# Patient Record
Sex: FEMALE | Race: WHITE | Employment: FULL TIME | URBAN - METROPOLITAN AREA
[De-identification: names, ages, dates, MRNs, and addresses within clinical notes are randomized per-mention and may not be internally consistent; named-entity substitution may affect disease eponyms.]

---

## 2018-03-24 ENCOUNTER — HOSPITAL ENCOUNTER (EMERGENCY)
Age: 43
Discharge: HOME | End: 2018-03-24
Attending: EMERGENCY MEDICINE | Admitting: EMERGENCY MEDICINE
Payer: COMMERCIAL

## 2018-03-24 VITALS
TEMPERATURE: 97.6 F | HEIGHT: 65 IN | SYSTOLIC BLOOD PRESSURE: 113 MMHG | WEIGHT: 130 LBS | OXYGEN SATURATION: 100 % | DIASTOLIC BLOOD PRESSURE: 74 MMHG | BODY MASS INDEX: 21.66 KG/M2 | RESPIRATION RATE: 12 BRPM | HEART RATE: 82 BPM

## 2018-03-24 DIAGNOSIS — E10.9 TYPE 1 DIABETES MELLITUS WITHOUT COMPLICATION (HCC): ICD-10-CM

## 2018-03-24 DIAGNOSIS — R73.9 BLOOD GLUCOSE ELEVATED: Primary | ICD-10-CM

## 2018-03-24 DIAGNOSIS — E86.0 DEHYDRATION: ICD-10-CM

## 2018-03-24 LAB
ALBUMIN SERPL-MCNC: 4.1 G/DL (ref 3.5–5)
ALBUMIN/GLOB SERPL: 1.4 {RATIO} (ref 1.1–2.2)
ALP SERPL-CCNC: 64 U/L (ref 45–117)
ALT SERPL-CCNC: 22 U/L (ref 12–78)
ANION GAP SERPL CALC-SCNC: 12 MMOL/L (ref 5–15)
APPEARANCE UR: CLEAR
AST SERPL-CCNC: 17 U/L (ref 15–37)
BACTERIA URNS QL MICRO: NEGATIVE /HPF
BASOPHILS # BLD: 0.1 K/UL (ref 0–0.1)
BASOPHILS NFR BLD: 1 % (ref 0–1)
BILIRUB SERPL-MCNC: 1.2 MG/DL (ref 0.2–1)
BILIRUB UR QL: NEGATIVE
BUN SERPL-MCNC: 13 MG/DL (ref 6–20)
BUN/CREAT SERPL: 13 (ref 12–20)
CALCIUM SERPL-MCNC: 8.9 MG/DL (ref 8.5–10.1)
CHLORIDE SERPL-SCNC: 97 MMOL/L (ref 97–108)
CO2 SERPL-SCNC: 26 MMOL/L (ref 21–32)
COLOR UR: ABNORMAL
CREAT SERPL-MCNC: 0.97 MG/DL (ref 0.55–1.02)
DIFFERENTIAL METHOD BLD: NORMAL
EOSINOPHIL # BLD: 0.2 K/UL (ref 0–0.4)
EOSINOPHIL NFR BLD: 2 % (ref 0–7)
EPITH CASTS URNS QL MICRO: ABNORMAL /LPF
ERYTHROCYTE [DISTWIDTH] IN BLOOD BY AUTOMATED COUNT: 12.5 % (ref 11.5–14.5)
EST. AVERAGE GLUCOSE BLD GHB EST-MCNC: 183 MG/DL
GLOBULIN SER CALC-MCNC: 3 G/DL (ref 2–4)
GLUCOSE SERPL-MCNC: 273 MG/DL (ref 65–100)
GLUCOSE UR STRIP.AUTO-MCNC: >1000 MG/DL
HBA1C MFR BLD: 8 % (ref 4.2–6.3)
HCT VFR BLD AUTO: 40.2 % (ref 35–47)
HGB BLD-MCNC: 13.2 G/DL (ref 11.5–16)
HGB UR QL STRIP: NEGATIVE
HYALINE CASTS URNS QL MICRO: ABNORMAL /LPF (ref 0–5)
IMM GRANULOCYTES # BLD: 0 K/UL (ref 0–0.04)
IMM GRANULOCYTES NFR BLD AUTO: 0 % (ref 0–0.5)
KETONES UR QL STRIP.AUTO: 80 MG/DL
LEUKOCYTE ESTERASE UR QL STRIP.AUTO: NEGATIVE
LIPASE SERPL-CCNC: 102 U/L (ref 73–393)
LYMPHOCYTES # BLD: 2.1 K/UL (ref 0.8–3.5)
LYMPHOCYTES NFR BLD: 21 % (ref 12–49)
MCH RBC QN AUTO: 29.9 PG (ref 26–34)
MCHC RBC AUTO-ENTMCNC: 32.8 G/DL (ref 30–36.5)
MCV RBC AUTO: 91.2 FL (ref 80–99)
MONOCYTES # BLD: 0.5 K/UL (ref 0–1)
MONOCYTES NFR BLD: 5 % (ref 5–13)
NEUTS SEG # BLD: 7.3 K/UL (ref 1.8–8)
NEUTS SEG NFR BLD: 72 % (ref 32–75)
NITRITE UR QL STRIP.AUTO: NEGATIVE
NRBC # BLD: 0 K/UL (ref 0–0.01)
NRBC BLD-RTO: 0 PER 100 WBC
PH UR STRIP: 5.5 [PH] (ref 5–8)
PLATELET # BLD AUTO: 211 K/UL (ref 150–400)
PMV BLD AUTO: 10.5 FL (ref 8.9–12.9)
POTASSIUM SERPL-SCNC: 3.7 MMOL/L (ref 3.5–5.1)
PROT SERPL-MCNC: 7.1 G/DL (ref 6.4–8.2)
PROT UR STRIP-MCNC: NEGATIVE MG/DL
RBC # BLD AUTO: 4.41 M/UL (ref 3.8–5.2)
RBC #/AREA URNS HPF: ABNORMAL /HPF (ref 0–5)
SODIUM SERPL-SCNC: 135 MMOL/L (ref 136–145)
SP GR UR REFRACTOMETRY: 1.03 (ref 1–1.03)
UR CULT HOLD, URHOLD: NORMAL
UROBILINOGEN UR QL STRIP.AUTO: 0.2 EU/DL (ref 0.2–1)
WBC # BLD AUTO: 10.2 K/UL (ref 3.6–11)
WBC URNS QL MICRO: ABNORMAL /HPF (ref 0–4)

## 2018-03-24 PROCEDURE — 81001 URINALYSIS AUTO W/SCOPE: CPT | Performed by: NURSE PRACTITIONER

## 2018-03-24 PROCEDURE — 74011250636 HC RX REV CODE- 250/636: Performed by: NURSE PRACTITIONER

## 2018-03-24 PROCEDURE — 99284 EMERGENCY DEPT VISIT MOD MDM: CPT

## 2018-03-24 PROCEDURE — 83036 HEMOGLOBIN GLYCOSYLATED A1C: CPT

## 2018-03-24 PROCEDURE — 83690 ASSAY OF LIPASE: CPT | Performed by: NURSE PRACTITIONER

## 2018-03-24 PROCEDURE — 85025 COMPLETE CBC W/AUTO DIFF WBC: CPT | Performed by: NURSE PRACTITIONER

## 2018-03-24 PROCEDURE — 96360 HYDRATION IV INFUSION INIT: CPT

## 2018-03-24 PROCEDURE — 36415 COLL VENOUS BLD VENIPUNCTURE: CPT | Performed by: NURSE PRACTITIONER

## 2018-03-24 PROCEDURE — 80053 COMPREHEN METABOLIC PANEL: CPT | Performed by: NURSE PRACTITIONER

## 2018-03-24 RX ADMIN — SODIUM CHLORIDE 1000 ML: 900 INJECTION, SOLUTION INTRAVENOUS at 12:45

## 2018-03-24 NOTE — ED NOTES
Patient stated that her blood sugar has been elevated between 250 -395 over the pass 12hrs. Patient also stated that she has been, nauseated through the night and vomited twice around 1030 in the am. She stated that she came in worried about going into DKA.

## 2018-03-24 NOTE — Clinical Note
Thank you for allowing us to care for you today. Please follow-up with your Primary Care provider in the next 2-3 days if your symptoms do not improve. Plan for home: Follow-up with primary care provider for continued blood glucose issues. C ome back to the ER if your symptoms do not improve or worsen.

## 2018-03-24 NOTE — ED PROVIDER NOTES
HPI Comments: Patient is a 52-year-old female with a past medical history significant for type 1 diabetes diagnosed at the age of 7 on insulin pump with a sensor who comes to the ED today ambulatory with complaints of elevated blood sugars in the 250s to 300s with associated nausea and vomiting. Patient states she has been up most of the night trying to correct her blood sugars. Patient states she has not felt well but is without any specific complaints. Patient does state approximately 30 minutes prior to coming to the ED she had an episode of nausea and vomiting. Patient also states that she gave herself an additional 3 units of NovoLog insulin early this morning and with an additional 3 units after speaking with her diabetes provider. Patient denies recent illness, fever, chills, chest pain or shortness of breath. She does state that on her insulin pump that has recently been updated approximately one month ago in order to try troubleshooting she changed her site, tingling, insulin bottle. Patient notes she has never had hospitalization to do ketoacidosis. She has no further complaints at this time. Primary care provider:Marie Horn MD    Endocrinologist: Jae Carter MD    The history is provided by the patient. No  was used. Past Medical History:   Diagnosis Date    Adhesive capsulitis     Diabetes mellitus type 1 ([facility] [address]     dx 1984       No past surgical history on file. Family History:   Problem Relation Age of Onset    Thyroid Disease Paternal Grandmother        Social History     Social History    Marital status:      Spouse name: N/A    Number of children: N/A    Years of education: N/A     Occupational History    Not on file.      Social History Main Topics    Smoking status: Never Smoker    Smokeless tobacco: Not on file    Alcohol use No    Drug use: Not on file    Sexual activity: Not on file     Other Topics Concern    Not on file     Social History Narrative    No narrative on file         ALLERGIES: Review of patient's allergies indicates no known allergies. Review of Systems   Constitutional: Negative for appetite change, chills and fever. HENT: Negative. Respiratory: Negative for cough, shortness of breath and wheezing. Cardiovascular: Negative for chest pain. Gastrointestinal: Positive for nausea and vomiting. Negative for abdominal pain, constipation and diarrhea. Endocrine:        Elevated BG on insulin pump with sensor   Genitourinary: Negative for dysuria and urgency. Musculoskeletal: Negative for back pain. Skin: Negative for color change and rash. Neurological: Negative for dizziness and headaches. Psychiatric/Behavioral: Negative. All other systems reviewed and are negative. Vitals:    03/24/18 1103 03/24/18 1116   BP: 105/62 106/64   Pulse: 94 82   Resp: 16 12   Temp: 97.8 °F (36.6 °C) 97.6 °F (36.4 °C)   SpO2: 100% 100%   Weight: 59 kg (130 lb) 59 kg (130 lb)   Height: 5' 4\" (1.626 m) 5' 4.5\" (1.638 m)            Physical Exam   Constitutional: She is oriented to person, place, and time. She appears well-developed and well-nourished. HENT:   Head: Normocephalic and atraumatic. Neck: Normal range of motion. Neck supple. Cardiovascular: Normal rate, regular rhythm, normal heart sounds and intact distal pulses. Pulmonary/Chest: Effort normal and breath sounds normal. No respiratory distress. She has no wheezes. She has no rales. She exhibits no tenderness. Abdominal: Soft. Bowel sounds are normal. There is no tenderness. There is no guarding. Musculoskeletal: Normal range of motion. Neurological: She is alert and oriented to person, place, and time. Skin: Skin is warm and dry. No erythema. Psychiatric: She has a normal mood and affect. Her behavior is normal. Judgment and thought content normal.   Nursing note and vitals reviewed.        [facility]      ED Course     Assessment & Plan:     Orders Placed This Encounter    URINE CULTURE HOLD SAMPLE    Hold Sample    URINALYSIS W/MICROSCOPIC    CBC WITH AUTOMATED DIFF    METABOLIC PANEL, COMPREHENSIVE    LIPASE       Discussed with Ly Fuchs MD,ED Provider    Belkis Shabazz NP  03/24/18  11:23 AM      Procedures     BG elevated with glucose in the urine with Ketones. Will bolus with 1 L NS and monitor BG from her insulin pump. BG, Urine glucose & ketones are only concerning labs. Otherwise labs not concerning. Belkis Shabazz NP  03/24/18  12:44 PM    BG has significantly improved with IV hydration. She did speak with [employer] & they ran the device through a diagnostic. The senor can read high when there is dehydration. 1:57 PM  The patient has been reevaluated. The patient is ready for discharge. The patient's signs, symptoms, diagnosis, and discharge instructions have been discussed and the patient/ family has conveyed their understanding. The patient is to follow up as recommended or return to the ED should their symptoms worsen. Plan has been discussed and the patient is in agreement. LABORATORY TESTS:  Labs Reviewed   URINALYSIS W/MICROSCOPIC - Abnormal; Notable for the following:        Result Value    Glucose >1000 (*)     Ketone 80 (*)     All other components within normal limits   METABOLIC PANEL, COMPREHENSIVE - Abnormal; Notable for the following:     Sodium 135 (*)     Glucose 273 (*)     Bilirubin, total 1.2 (*)     All other components within normal limits   URINE CULTURE HOLD SAMPLE   SAMPLES BEING HELD   CBC WITH AUTOMATED DIFF   LIPASE   HEMOGLOBIN A1C WITH EAG       IMAGING RESULTS:  No results found. MEDICATIONS GIVEN:  Medications   sodium chloride 0.9 % bolus infusion 1,000 mL (1,000 mL IntraVENous New Bag 3/24/18 1245)       IMPRESSION:  1. Blood glucose elevated    2. Type 1 diabetes mellitus without complication (HCC)    3. Dehydration        PLAN:  1.    Current Discharge Medication List      CONTINUE these medications which have NOT CHANGED    Details   insulin aspart (NOVOLOG) 100 unit/mL injection Inject 100-120 units daily per insulin pump. Qty: 40 mL, Refills: 6           2. Follow-up Information     Follow up With Details Comments [address], [place] Schedule an appointment as soon as possible for a visit As needed Geraldine Sultana [address]      [facility] EMERGENCY DEPT  As needed, If symptoms worsen [facility]  [phone]        3.      Return to ED for new or worsening symptoms       Juan Matta NP

## 2018-03-24 NOTE — ED TRIAGE NOTES
Pt arrives c/o high blood sugar x 1 day. Type I diabetic, uses insulin pump. BG in 200's yesterday, most recent 341. Vomited x2, nausea, weakness. Pt denies fevers.

## 2018-03-24 NOTE — DISCHARGE INSTRUCTIONS
Learning About Insulin Pumps  What is an insulin pump? An insulin pump is a tiny computer that delivers insulin into your body. With a pump, you don't have to give yourself insulin shots throughout the day. You program the pump to do this. You can also give yourself an extra dose of insulin when you need it. A pump may give you more freedom to eat, sleep, and exercise when you want. How does it work? The pump sends insulin through a narrow plastic tube (a catheter) that ends in a tiny needle. The needle goes into your skin. The tube and needle are called an infusion set. With most infusion sets, the needle pulls out, leaving a tiny flexible tube called a cannula under your skin. You can't even feel that it's there. Some pumps attach directly to the body and do not need tubing. With this type, a remote device controls the pump. And some pumps are disposable and do not use tubing or a remote control. A pump with no tubing is sometimes called a \"pump patch. \"  An insulin pump gives you a constant trickle of insulin throughout the day and night. This is called your basal amount. You set this up to keep your blood sugar in your target range throughout the day. You can use the pump to give yourself extra insulin when you eat a meal or a snack. You can give yourself less insulin when you are very active or exercising. You also can give yourself more insulin any time you feel you need more than your basal amount of insulin. Why do some people prefer pumps? An insulin pump can help you control your blood sugar. · A pump may help you keep your blood sugar closer to normal. You may have fewer big swings in your blood sugar levels. · A pump can deliver an exact amount of insulin and in very small amounts. · The pump may help you keep your blood sugar under control without also causing low blood sugar. · A pump may improve your hemoglobin A1c level.   · You don't have to give yourself shots several times a day.  · You don't have to plan your life around your insulin shots. You don't have to stop what you're doing and give yourself a shot. · Some pumps also work as a blood sugar meter or they communicate with your meter. Some pumps continuously measure glucose. And some pumps can suggest how much insulin you need based on blood sugar readings. What are the drawbacks? Diabetes control  · A pump may not improve blood sugar control if you are already giving yourself insulin shots 3 or more times a day. · If you keep your blood sugar levels in a tight range, it may be harder for you to sense when your blood sugar is low. · If you are not good at counting your carbohydrate grams, the pump may not help you control your diabetes. Safety  · Diabetic ketoacidosis (DKA) may happen more often and more quickly with an insulin pump than with shots. Your blood sugar could get too high if something goes wrong with the catheter or pump and you don't notice. · You may get an infection where the catheter goes into the skin. You'll need to take good care of the site and change the catheter on schedule. Effort  · It may take time to set up the pump. You may have to spend time at a clinic, skip a few meals, and check your blood sugar more often than usual.  · Many insurance companies have strict guidelines that you will have to follow or they will not pay. · You may need to replace your infusion set every 2 or 3 days. · You may have to learn new routines in how and when you test your blood sugar andwhen you travel, play sports, and eat. How do you choose an insulin pump? Some people say choosing which pump to use is harder than deciding to switch to a pump. There are a number of insulin pump companies, and each pump is slightly different. Ask members of your diabetes team which pumps they recommend. If you have insurance, find out which pump brands are covered.  Then ask those companies to send you information or check their websites. You should be able to try out a pump with saline solution. That way you can see how it works and feels. Follow-up care is a key part of your treatment and safety. Be sure to make and go to all appointments, and call your doctor if you are having problems. It's also a good idea to know your test results and keep a list of the medicines you take. Where can you learn more? Go to http://sven-viet.info/. Enter I313 in the search box to learn more about \"Learning About Insulin Pumps. \"  Current as of: March 13, 2017  Content Version: 11.4  © 1494-6480 [employer]. Care instructions adapted under license by [employer] (which disclaims liability or warranty for this information). If you have questions about a medical condition or this instruction, always ask your healthcare professional. [address] any warranty or liability for your use of this information.

## 2018-04-10 ENCOUNTER — TELEPHONE (OUTPATIENT)
Dept: INTERNAL MEDICINE CLINIC | Age: 43
End: 2018-04-10

## 2018-04-10 NOTE — TELEPHONE ENCOUNTER
She is asking to speak with nurse regarding some issue.  Before she makes appointment   Her no is [phone]

## 2018-04-10 NOTE — TELEPHONE ENCOUNTER
Patient states she has some autoimmune concerns & would like to address this. I discussed she has not been seen since 2013 and she would need to re-establish care before we can provide care to her. Patient states she sees her endocrine & gyn doctor's regularly & only used our office if she was sick. 's next NP appt wasn't until Sept so patient scheduled with  for May 24th. She will address her concerns with her endo doctor for now.

## 2018-05-24 ENCOUNTER — OFFICE VISIT (OUTPATIENT)
Dept: INTERNAL MEDICINE CLINIC | Age: 43
End: 2018-05-24

## 2018-05-24 VITALS
BODY MASS INDEX: 22.16 KG/M2 | DIASTOLIC BLOOD PRESSURE: 67 MMHG | HEART RATE: 67 BPM | TEMPERATURE: 98.1 F | WEIGHT: 133 LBS | RESPIRATION RATE: 16 BRPM | OXYGEN SATURATION: 99 % | SYSTOLIC BLOOD PRESSURE: 103 MMHG | HEIGHT: 65 IN

## 2018-05-24 DIAGNOSIS — Z76.89 ENCOUNTER TO ESTABLISH CARE WITH NEW DOCTOR: Primary | ICD-10-CM

## 2018-05-24 DIAGNOSIS — E10.8 TYPE 1 DIABETES MELLITUS WITH COMPLICATION (HCC): ICD-10-CM

## 2018-05-24 DIAGNOSIS — L65.9 HAIR LOSS: ICD-10-CM

## 2018-05-24 NOTE — PROGRESS NOTES
1. Have you been to the ER, urgent care clinic since your last visit? Hospitalized since your last visit? Yes, March 24    2. Have you seen or consulted any other health care providers outside of the [address] [place] [address] since your last visit? Include any pap smears or colon screening. Dr. Rubina Sparrow, with [facility]. Dr. Scott Rose, with [facility], for OBGYN. Eye exams done with [facility] [place].

## 2018-05-24 NOTE — MR AVS SNAPSHOT
[address] [facility] 
 
 
 [address] [place] [facility] 
[phone] Patient: Anders Rivera MRN: [id number] :1975 Visit Information Date & Time Provider Department Dept. Phone Encounter #  
 2018 10:45 AM Sona Pineda MD [facility] [address] [id number] Upcoming Health Maintenance Date Due  
 LIPID PANEL Q1 1976 MICROALBUMIN Q1 1986 Pneumococcal 19-64 Medium Risk (1 of 1 - PPSV23) 1995 DTaP/Tdap/Td series (1 - Tdap) 1997 PAP AKA CERVICAL CYTOLOGY 1997 FOOT EXAM Q1 2016 EYE EXAM RETINAL OR DILATED Q1 2017 Influenza Age 5 to Adult 2018 HEMOGLOBIN A1C Q6M 2018 Allergies as of 2018  Review Complete On: 7710 By: Vermichellea Diana Wears No Known Allergies Current Immunizations  Never Reviewed No immunizations on file. Not reviewed this visit You Were Diagnosed With   
  
 Codes Comments Encounter to establish care with new doctor    -  Primary ICD-10-CM: Z76.89 
ICD-9-CM: V65.8 Type 1 diabetes mellitus with complication (HCC)     YZZ-22-WA: E10.8 ICD-9-CM: 250.91 Hair loss     ICD-10-CM: L65.9 ICD-9-CM: 704.00 Vitals BP Pulse Temp Resp Height(growth percentile) Weight(growth percentile) 103/67 (BP 1 Location: Left arm, BP Patient Position: Sitting) 67 98.1 °F (36.7 °C) (Oral) 16 5' 4.5\" (1.638 m) 133 lb (60.3 kg) SpO2 BMI OB Status Smoking Status 99% 22.48 kg/m2 IUD Never Smoker Vitals History BMI and BSA Data Body Mass Index Body Surface Area  
 22.48 kg/m 2 1.66 m 2 Preferred Pharmacy Pharmacy Name Phone [id number] [phone], [address] [phone] Your Updated Medication List  
  
   
This list is accurate as of 18 11:45 AM.  Always use your most recent med list.  
  
  
  
  
 insulin aspart U-100 100 unit/mL injection Commonly known as:  NovoLOG U-100 Insulin aspart Inject 100-120 units daily per insulin pump. MIRENA 20 mcg/24 hr (5 years) Iud  
Generic drug:  levonorgestrel 1 Device by IntraUTERine route once. OTHER  
  
 OTHER Patient Instructions Well Visit, Ages 25 to 48: Care Instructions Your Care Instructions Physical exams can help you stay healthy. Your doctor has checked your overall health and may have suggested ways to take good care of yourself. He or she also may have recommended tests. At home, you can help prevent illness with healthy eating, regular exercise, and other steps. Follow-up care is a key part of your treatment and safety. Be sure to make and go to all appointments, and call your doctor if you are having problems. It's also a good idea to know your test results and keep a list of the medicines you take. How can you care for yourself at home? · Reach and stay at a healthy weight. This will lower your risk for many problems, such as obesity, diabetes, heart disease, and high blood pressure. · Get at least 30 minutes of physical activity on most days of the week. Walking is a good choice. You also may want to do other activities, such as running, swimming, cycling, or playing tennis or team sports. Discuss any changes in your exercise program with your doctor. · Do not smoke or allow others to smoke around you. If you need help quitting, talk to your doctor about stop-smoking programs and medicines. These can increase your chances of quitting for good. · Talk to your doctor about whether you have any risk factors for sexually transmitted infections (STIs). Having one sex partner (who does not have STIs and does not have sex with anyone else) is a good way to avoid these infections. · Use birth control if you do not want to have children at this time.  Talk with your doctor about the choices available and what might be best for you. · Protect your skin from too much sun. When you're outdoors from 10 a.m. to 4 p.m., stay in the shade or cover up with clothing and a hat with a wide brim. Wear sunglasses that block UV rays. Even when it's cloudy, put broad-spectrum sunscreen (SPF 30 or higher) on any exposed skin. · See a dentist one or two times a year for checkups and to have your teeth cleaned. · Wear a seat belt in the car. · Drink alcohol in moderation, if at all. That means no more than 2 drinks a day for men and 1 drink a day for women. Follow your doctor's advice about when to have certain tests. These tests can spot problems early. For everyone · Cholesterol. Have the fat (cholesterol) in your blood tested after age 21. Your doctor will tell you how often to have this done based on your age, family history, or other things that can increase your risk for heart disease. · Blood pressure. Have your blood pressure checked during a routine doctor visit. Your doctor will tell you how often to check your blood pressure based on your age, your blood pressure results, and other factors. · Vision. Talk with your doctor about how often to have a glaucoma test. 
· Diabetes. Ask your doctor whether you should have tests for diabetes. · Colon cancer. Have a test for colon cancer at age 48. You may have one of several tests. If you are younger than 48, you may need a test earlier if you have any risk factors. Risk factors include whether you already had a precancerous polyp removed from your colon or whether your parent, brother, sister, or child has had colon cancer. For women · Breast exam and mammogram. Talk to your doctor about when you should have a clinical breast exam and a mammogram. Medical experts differ on whether and how often women under 50 should have these tests. Your doctor can help you decide what is right for you. · Pap test and pelvic exam. Begin Pap tests at age 24. A Pap test is the best way to find cervical cancer. The test often is part of a pelvic exam. Ask how often to have this test. 
· Tests for sexually transmitted infections (STIs). Ask whether you should have tests for STIs. You may be at risk if you have sex with more than one person, especially if your partners do not wear condoms. For men · Tests for sexually transmitted infections (STIs). Ask whether you should have tests for STIs. You may be at risk if you have sex with more than one person, especially if you do not wear a condom. · Testicular cancer exam. Ask your doctor whether you should check your testicles regularly. · Prostate exam. Talk to your doctor about whether you should have a blood test (called a PSA test) for prostate cancer. Experts differ on whether and when men should have this test. Some experts suggest it if you are older than 39 and are -American or have a father or brother who got prostate cancer when he was younger than 72. When should you call for help? Watch closely for changes in your health, and be sure to contact your doctor if you have any problems or symptoms that concern you. Where can you learn more? Go to http://sven-viet.info/. Enter P072 in the search box to learn more about \"Well Visit, Ages 25 to 48: Care Instructions. \" Current as of: May 12, 2017 Content Version: 11.4 © 4061-4733 [employer]. Care instructions adapted under license by [employer] (which disclaims liability or warranty for this information). If you have questions about a medical condition or this instruction, always ask your healthcare professional. [place] [address] any warranty or liability for your use of this information. Introducing [facility]! Dear Radha Knutson: Thank you for requesting a [employer] account.   Our records indicate that you already have an active [employer] account. You can access your account anytime at [email or web address] Did you know that you can access your hospital and ER discharge instructions at any time in [employer]is? You can also review all of your test results from your hospital stay or ER visit. Additional Information If you have questions, please visit the Frequently Asked Questions section of the [employer] website at [email or web address] Remember, Neoh[employer]is is NOT to be used for urgent needs. For medical emergencies, dial 911. Now available from your iPhone and Android! Please provide this summary of care documentation to your next provider. Your primary care clinician is listed as Sera Golden. If you have any questions after today's visit, please call [phone].

## 2018-05-24 NOTE — PROGRESS NOTES
Bethany Castro is a 44 y.o. female who presents today for Establish Care  . She has a history of   Patient Active Problem List   Diagnosis Code    Diabetes mellitus type 1 ([facility] E10.9   . Today patient is here to establish care. Previous PCP Dr. Jacobo Alfaro. she is switching because Has not been seen in some time. Records are not available for me to review. she does not have other concerns. Hair Loss: noted in the spring. Has improved since. Notes some previous stress with daughter with ALL. DM: Type I. Diagnosed at 7 y.o. Has insulin pump. Sees Dr. Reneé Grady. A1C at 7.6 in Early May. On a tandom monitor which is a real time continuous glucose monitor. Issues with sensors in March leading to elevated BG. Microalbuminuria is negative in Jan.   LDL 73 last year. Social: Healthy. Has 2 children. One daughter with history of ALL. Works in [employer]. . Home with  and kids. Tobacco: none. GYN: sees Dr. Celia Zurita. Family: Mother: Healthy. Father: Healthy    AI: On father's side. ROS  Review of Systems   Constitutional: Negative for chills, fever and weight loss. Respiratory: Negative for cough and shortness of breath. Cardiovascular: Negative for chest pain, palpitations and leg swelling. Gastrointestinal: Negative for abdominal pain, diarrhea, heartburn, nausea and vomiting. Genitourinary: Negative for dysuria, frequency, hematuria and urgency. Neurological: Negative. Endo/Heme/Allergies: Does not bruise/bleed easily. Psychiatric/Behavioral: Negative for depression. The patient is not nervous/anxious.         Visit Vitals    /67 (BP 1 Location: Left arm, BP Patient Position: Sitting)    Pulse 67    Temp 98.1 °F (36.7 °C) (Oral)    Resp 16    Ht 5' 4.5\" (1.638 m)    Wt 133 lb (60.3 kg)    SpO2 99%    BMI 22.48 kg/m2       Physical Exam   Constitutional: She is oriented to person, place, and time and well-developed, well-nourished, and in no distress. No distress. HENT:   Head: Normocephalic and atraumatic. Mouth/Throat: No oropharyngeal exudate. Eyes: Conjunctivae are normal. Pupils are equal, round, and reactive to light. No scleral icterus. Neck: Normal range of motion. No JVD present. No thyromegaly present. Cardiovascular: Normal rate and regular rhythm. Exam reveals no gallop and no friction rub. No murmur heard. Pulmonary/Chest: Effort normal and breath sounds normal. No respiratory distress. She has no wheezes. Abdominal: Soft. Bowel sounds are normal. She exhibits no distension. There is no rebound and no guarding. Musculoskeletal: Normal range of motion. She exhibits no edema. Neurological: She is alert and oriented to person, place, and time. No cranial nerve deficit. Skin: Skin is dry. No rash noted. Psychiatric: Mood, memory, affect and judgment normal.       Current Outpatient Prescriptions   Medication Sig    OTHER     levonorgestrel (MIRENA) 20 mcg/24 hr (5 years) IUD 1 Device by IntraUTERine route once.  OTHER     insulin aspart (NOVOLOG) 100 unit/mL injection Inject 100-120 units daily per insulin pump. No current facility-administered medications for this visit. Past Medical History:   Diagnosis Date    Adhesive capsulitis     Diabetes mellitus type 1 ([facility]     dx 1984      History reviewed. No pertinent surgical history. Social History   Substance Use Topics    Smoking status: Never Smoker    Smokeless tobacco: Never Used    Alcohol use Yes      Comment: social       Family History   Problem Relation Age of Onset    Thyroid Disease Paternal Grandmother     Lupus Paternal Grandmother     Arthritis-rheumatoid Paternal Grandmother     Cancer Paternal Grandfather      prostate        No Known Allergies     Assessment/Plan  Diagnoses and all orders for this visit:    1. Encounter to establish care with new doctor - Had been seeing Dr. Noel Pardo. Will re-establish. 2. Type 1 diabetes mellitus with complication ([address] - seeing endo. A1C <7.8. Continue to f/u with endo. 3. Hair loss - has resolved. Normal thyroid. Discussed role of stress on this.          Follow-up Disposition: Not on File    Deshawn Adams MD  5/24/2018

## 2018-05-24 NOTE — PATIENT INSTRUCTIONS

## 2020-08-31 ENCOUNTER — TELEPHONE (OUTPATIENT)
Dept: INTERNAL MEDICINE CLINIC | Age: 45
End: 2020-08-31

## 2020-08-31 NOTE — TELEPHONE ENCOUNTER
Spoke with patient and she states that she was laying in bed and turned over and felt suddenly dizzy, she had to brace herself to sit up in bed, then felt a wave of nausea. Pt states that she has had very slight congestion over the last few weeks and the thought has crossed her mind that it was allergies but she did not try any medication. Advised pt she could try some zyrtec or claritin to see if that helps with her \"stuffiness\". Recommended that patient keep her virtual visit scheduled tomorrow with Dr. Keke Junior for him to assess her condition. Pt understood and was thankful for the call.

## 2020-08-31 NOTE — TELEPHONE ENCOUNTER
----- Message from Irving Pope sent at 8/31/2020  1:45 PM EDT -----  Regarding: TOMMY/TELEPHONE  Contact: [phone]  Level 1/Escalated Issue      Caller's first and last name and relationship (if not the patient): N/A      Best contact number(s): [phone]      What are the symptoms: extreme dizziness with nausea and vomiting      Transfer successful - yes/no (include outcome): No      Transfer declined - yes/no (include reason): No      Was caller advised to seek appropriate level of care - yes/no: Yes      Details to clarify the request: Per patient last night she experienced extreme dizziness with nausea and vomiting.  Virtual visit scheduled for 09/01/20 at 7:30 AM.        Irving Pope

## 2020-09-01 ENCOUNTER — VIRTUAL VISIT (OUTPATIENT)
Dept: INTERNAL MEDICINE CLINIC | Age: 45
End: 2020-09-01
Payer: COMMERCIAL

## 2020-09-01 DIAGNOSIS — R42 DIZZINESS: Primary | ICD-10-CM

## 2020-09-01 DIAGNOSIS — H81.10 BENIGN PAROXYSMAL POSITIONAL VERTIGO, UNSPECIFIED LATERALITY: ICD-10-CM

## 2020-09-01 PROCEDURE — 99213 OFFICE O/P EST LOW 20 MIN: CPT | Performed by: INTERNAL MEDICINE

## 2020-09-01 RX ORDER — BLOOD-GLUCOSE TRANSMITTER
EACH MISCELLANEOUS
COMMUNITY
Start: 2020-06-30

## 2020-09-01 RX ORDER — BLOOD-GLUCOSE SENSOR
EACH MISCELLANEOUS
COMMUNITY
Start: 2020-08-14

## 2020-09-01 RX ORDER — ONDANSETRON 4 MG/1
4 TABLET, ORALLY DISINTEGRATING ORAL
Qty: 20 TAB | Refills: 1 | Status: SHIPPED | OUTPATIENT
Start: 2020-09-01

## 2020-09-01 NOTE — PROGRESS NOTES
Juan Velasquez was seen on 9/1/2020 using synchronous (real-time) audio-video technology; doxy. me. Consent: Juan Velasquez, who was seen by synchronous (real-time) audio-video technology, and/or her healthcare decision maker, is aware that this patient-initiated, Telehealth encounter on 9/1/2020 is a billable service, with coverage as determined by her insurance carrier. She is aware that she may receive a bill and has provided verbal consent to proceed: Yes. I was in the office while conducting this encounter. Juan Velasquez is a 41 y.o. female who presents today for Dizziness and Nausea  . She has a history of   Patient Active Problem List   Diagnosis Code    Diabetes mellitus type 1 ([facility] E10.9    Hair loss L65.9    Moderate nonproliferative diabetic retinopathy ([facility] K52.1686   . Today patient is being seen for an acute visit. Problem visit:  Juan Velasquez is here for complaint of dizziness. Problem began 2 day(s) ago. Severity is moderate  Character of problem: episode of room spinning. Had nausea and several episodes of vomiting. Rolling over makes the problem worse. Began getting clammy. No LOC. Has not had any other symptoms since. Been relatively well controlled. Does admit to some increases in allergy symptoms recently. DM: last A1C was in range. Last labs were in August    ROS  Review of Systems   Constitutional: Negative for chills, fever and weight loss. HENT: Negative for congestion and sore throat. Eyes: Negative for blurred vision, double vision and photophobia. Respiratory: Negative for cough and shortness of breath. Cardiovascular: Negative for chest pain, palpitations and leg swelling. Gastrointestinal: Negative for abdominal pain, constipation, diarrhea, heartburn, nausea and vomiting. Genitourinary: Negative for dysuria, frequency and urgency. Musculoskeletal: Negative for joint pain and myalgias. Skin: Negative for rash. Neurological: Positive for dizziness. Negative for tingling, tremors, sensory change and headaches. Endo/Heme/Allergies: Does not bruise/bleed easily. Psychiatric/Behavioral: Negative for memory loss and suicidal ideas. There were no vitals taken for this visit. Patient-Reported Vitals 9/1/2020   Patient-Reported Weight 135lbs   Patient-Reported Pulse 77   Patient-Reported Systolic  156   Patient-Reported Diastolic 65        Physical Exam  Constitutional:       Appearance: Normal appearance. HENT:      Head: Normocephalic and atraumatic. Pulmonary:      Effort: Pulmonary effort is normal.   Neurological:      General: No focal deficit present. Mental Status: She is alert. Psychiatric:         Mood and Affect: Mood normal.         Behavior: Behavior normal.           Current Outpatient Medications   Medication Sig    Dexcom G6 Sensor angela APPLY 1 EVERY 10 DAYS OR AS DIRECTED SUBCUTANEOUSLY    Dexcom G6 Transmitter angela USE AS DIRECTED    OTHER     levonorgestrel (MIRENA) 20 mcg/24 hr (5 years) IUD 1 Device by IntraUTERine route once.  insulin aspart (NOVOLOG) 100 unit/mL injection Inject 100-120 units daily per insulin pump. No current facility-administered medications for this visit. Past Medical History:   Diagnosis Date    Adhesive capsulitis     Diabetes mellitus type 1 ([facility]     dx 1984      History reviewed. No pertinent surgical history. Social History     Tobacco Use    Smoking status: Never Smoker    Smokeless tobacco: Never Used   Substance Use Topics    Alcohol use: Yes     Comment: social       Family History   Problem Relation Age of Onset    Thyroid Disease Paternal Grandmother     Lupus Paternal Grandmother    [facility] Arthritis-rheumatoid Paternal Grandmother     Cancer Paternal Grandfather         prostate        No Known Allergies     Assessment/Plan  Diagnoses and all orders for this visit:    1. Dizziness-patient with an acute episode of vertigo. Consistent with BPPV. We discussed given increased allergies starting to take Allegra daily for the next couple weeks. We discussed doing exercises which she will look up on the Internet. If this were to recur we will see her in the office for physical exam and further evaluation. -     ondansetron (ZOFRAN ODT) 4 mg disintegrating tablet; Take 1 Tab by mouth every eight (8) hours as needed for Nausea or Vomiting. 2. Benign paroxysmal positional vertigo, unspecified laterality  -     ondansetron (ZOFRAN ODT) 4 mg disintegrating tablet; Take 1 Tab by mouth every eight (8) hours as needed for Nausea or Vomiting. Gigi Barbosa is a 41 y.o. female being evaluated by a video visit encounter for concerns as above. A caregiver was present when appropriate. Due to this being a TeleHealth encounter (During QTKTZ-63 public health emergency), evaluation of the following organ systems was limited: Vitals/Constitutional/EENT/Resp/CV/GI//MS/Neuro/Skin/Heme-Lymph-Imm. Pursuant to the emergency declaration under the [facility], [facility] waiver authority and the [employer] Act, this Virtual  Visit was conducted, with patient's (and/or legal guardian's) consent, to reduce the patient's risk of exposure to COVID-19 and provide necessary medical care. Services were provided through a video synchronous discussion virtually to substitute for in-person clinic visit. Patient and provider were located at their individual homes. Brian Nicole MD  9/1/2020    This note was created with the help of speech recognition software [employer]) and may contain some 'sound alike' errors.

## 2020-11-05 ENCOUNTER — OFFICE VISIT (OUTPATIENT)
Dept: INTERNAL MEDICINE CLINIC | Age: 45
End: 2020-11-05
Payer: COMMERCIAL

## 2020-11-05 VITALS
RESPIRATION RATE: 18 BRPM | WEIGHT: 140.2 LBS | HEIGHT: 65 IN | BODY MASS INDEX: 23.36 KG/M2 | SYSTOLIC BLOOD PRESSURE: 112 MMHG | DIASTOLIC BLOOD PRESSURE: 66 MMHG | HEART RATE: 79 BPM | TEMPERATURE: 97.8 F | OXYGEN SATURATION: 98 %

## 2020-11-05 DIAGNOSIS — Z00.00 WELLNESS EXAMINATION: Primary | ICD-10-CM

## 2020-11-05 DIAGNOSIS — E10.69 TYPE 1 DIABETES MELLITUS WITH OTHER SPECIFIED COMPLICATION (HCC): ICD-10-CM

## 2020-11-05 DIAGNOSIS — Z80.0 FAMILY HISTORY OF COLORECTAL CANCER: ICD-10-CM

## 2020-11-05 PROCEDURE — 99396 PREV VISIT EST AGE 40-64: CPT | Performed by: INTERNAL MEDICINE

## 2020-11-05 NOTE — PROGRESS NOTES
Heather Virk is a 41 y.o. female who presents today for Shoulder Pain (left)  . She has a history of   Patient Active Problem List   Diagnosis Code    Diabetes mellitus type 1 ([facility] E10.9    Hair loss L65.9    Moderate nonproliferative diabetic retinopathy ([facility] X84.1794   . Today patient is here for CPE. Diabetes Mellitus follow-up-type I diabetic. Patient follows with Dr. Sharmin Winkler. She is currently on insulin pump. Last A1c was done 6.3. On tandem. Changing site more frequently due to swelling or discomfort. She will follow up with endocrinologist regarding this. Today her blood sugars have been quite high. She does not feel sick    Last hemoglobin a1c   Lab Results   Component Value Date/Time    Hemoglobin A1c 8.0 (H) 03/24/2018 11:44 AM     No components found for: POCA1C, HBA1C POC  medication compliance: compliant all of the time. Diabetic diet compliance: compliant most of the time. Home glucose monitoring: fasting values range very well controlled. Further diabetic ROS: no polyuria or polydipsia, no chest pain, dyspnea or TIA's, no numbness, tingling or pain in extremities. Microalbuminuria: No results found for: MCACR, MCA1, MCA2, MCA3, MCAU, MCAU2, MCALPOCT    Dizziness: Seen patient virtually in September for what seem to be a bout of BPPV since she reports that this has resolved. Health maintenance hx includes:  Exercise: moderately active. Form of exercise: Peloton. Diet: generally follows a low fat low cholesterol diet  Social: Healthy. Has 2 children. One daughter with history of ALL. (6th and 8th grader). Works in [employer]. . Home with  and kids. Tobacco: none. Screening:    Colon cancer screening:  Last Colonoscopy: Mother is undergoing treatment for colorectal cancer. She was diagnosed in her 62s. Patient has not yet had a colonoscopy.    Breast cancer screening: last mammogram 8/2020 and   was normal. Sees Dr. Lesli Galeano with [facility]   Cervical cancer screening: last PAP/Pelvic exam: 8/2020   and was normal.   Osteoporosis screening:  Last BMD: N/A      Immunizations: There is no immunization history on file for this patient. Immunization status: up to date and documented. Does not want PNA. ROS  Review of Systems   Constitutional: Negative for chills, fever and weight loss. HENT: Negative for congestion and sore throat. Eyes: Negative for blurred vision, double vision and photophobia. Respiratory: Negative for cough and shortness of breath. Cardiovascular: Negative for chest pain, palpitations and leg swelling. Gastrointestinal: Negative for abdominal pain, constipation, diarrhea, heartburn, nausea and vomiting. Genitourinary: Negative for dysuria, frequency and urgency. Musculoskeletal: Negative for joint pain and myalgias. Skin: Negative for rash. Neurological: Negative. Negative for headaches. Endo/Heme/Allergies: Does not bruise/bleed easily. Psychiatric/Behavioral: Negative for memory loss and suicidal ideas. Visit Vitals  /66 (BP 1 Location: Right arm, BP Patient Position: Sitting)   Pulse 79   Temp 97.8 °F (36.6 °C) (Oral)   Resp 18   Ht 5' 4.5\" (1.638 m)   Wt 140 lb 3.2 oz (63.6 kg)   SpO2 98%   BMI 23.69 kg/m²       Physical Exam  Constitutional:       General: She is not in acute distress. Appearance: She is well-developed. HENT:      Head: Normocephalic and atraumatic. Neck:      Musculoskeletal: Normal range of motion and neck supple. Thyroid: No thyromegaly. Cardiovascular:      Rate and Rhythm: Normal rate and regular rhythm. Heart sounds: No murmur. Pulmonary:      Effort: Pulmonary effort is normal.      Breath sounds: Normal breath sounds. No wheezing. Abdominal:      General: Bowel sounds are normal. There is no distension. Palpations: Abdomen is soft.    Musculoskeletal:      Comments: Foot exam  - skin intact, mild dryness w no fissures, no rashes, no significant ulcers or callous formation. Sensation intact by microfilament or light touch     Skin:     General: Skin is warm and dry. Neurological:      Mental Status: She is alert and oriented to person, place, and time. Cranial Nerves: No cranial nerve deficit. Psychiatric:         Behavior: Behavior normal.           Current Outpatient Medications   Medication Sig    Dexcom G6 Sensor angela APPLY 1 EVERY 10 DAYS OR AS DIRECTED SUBCUTANEOUSLY    Dexcom G6 Transmitter angela USE AS DIRECTED    ondansetron (ZOFRAN ODT) 4 mg disintegrating tablet Take 1 Tab by mouth every eight (8) hours as needed for Nausea or Vomiting.  OTHER     levonorgestrel (MIRENA) 20 mcg/24 hr (5 years) IUD 1 Device by IntraUTERine route once.  insulin aspart (NOVOLOG) 100 unit/mL injection Inject 100-120 units daily per insulin pump. No current facility-administered medications for this visit. Past Medical History:   Diagnosis Date    Adhesive capsulitis     Diabetes mellitus type 1 ([facility]     dx 1984      History reviewed. No pertinent surgical history. Social History     Tobacco Use    Smoking status: Never Smoker    Smokeless tobacco: Never Used   Substance Use Topics    Alcohol use: Yes     Comment: social       Family History   Problem Relation Age of Onset    Thyroid Disease Paternal Grandmother     Lupus Paternal Grandmother    [place] Arthritis-rheumatoid Paternal Grandmother     Cancer Paternal Grandfather         prostate        No Known Allergies     Assessment/Plan  Diagnoses and all orders for this visit:    1. Wellness examination- Hyacinth Lauren was counseled on age-appropriate/ guideline-based risk prevention behaviors and screening for a 41y.o. year old   female . We also discussed adjustments in screening based on family history if necessary.    Printed instructions for preventative screening guidelines and healthy behaviors given to patient with after visit summary. 2. Type 1 diabetes mellitus with other specified complication (HCC)-well controlled. Still has some variability with her tandem. Elevated today, does not feel sick    3. Family history of colorectal cancer-patient is overdue for colonoscopy given her mother's history.  -     REFERRAL TO GASTROENTEROLOGY            Brionna Conroy MD  11/5/2020    This note was created with the help of speech recognition software Fabiola Bass) and may contain some 'sound alike' errors.

## 2020-11-05 NOTE — PATIENT INSTRUCTIONS
Well Visit, Ages 25 to 48: Care Instructions Your Care Instructions Physical exams can help you stay healthy. Your doctor has checked your overall health and may have suggested ways to take good care of yourself. He or she also may have recommended tests. At home, you can help prevent illness with healthy eating, regular exercise, and other steps. Follow-up care is a key part of your treatment and safety. Be sure to make and go to all appointments, and call your doctor if you are having problems. It's also a good idea to know your test results and keep a list of the medicines you take. How can you care for yourself at home? · Reach and stay at a healthy weight. This will lower your risk for many problems, such as obesity, diabetes, heart disease, and high blood pressure. · Get at least 30 minutes of physical activity on most days of the week. Walking is a good choice. You also may want to do other activities, such as running, swimming, cycling, or playing tennis or team sports. Discuss any changes in your exercise program with your doctor. · Do not smoke or allow others to smoke around you. If you need help quitting, talk to your doctor about stop-smoking programs and medicines. These can increase your chances of quitting for good. · Talk to your doctor about whether you have any risk factors for sexually transmitted infections (STIs). Having one sex partner (who does not have STIs and does not have sex with anyone else) is a good way to avoid these infections. · Use birth control if you do not want to have children at this time. Talk with your doctor about the choices available and what might be best for you. · Protect your skin from too much sun. When you're outdoors from 10 a.m. to 4 p.m., stay in the shade or cover up with clothing and a hat with a wide brim. Wear sunglasses that block UV rays. Even when it's cloudy, put broad-spectrum sunscreen (SPF 30 or higher) on any exposed skin. · See a dentist one or two times a year for checkups and to have your teeth cleaned. · Wear a seat belt in the car. Follow your doctor's advice about when to have certain tests. These tests can spot problems early. For everyone · Cholesterol. Have the fat (cholesterol) in your blood tested after age 21. Your doctor will tell you how often to have this done based on your age, family history, or other things that can increase your risk for heart disease. · Blood pressure. Have your blood pressure checked during a routine doctor visit. Your doctor will tell you how often to check your blood pressure based on your age, your blood pressure results, and other factors. · Vision. Talk with your doctor about how often to have a glaucoma test. 
· Diabetes. Ask your doctor whether you should have tests for diabetes. · Colon cancer. Your risk for colorectal cancer gets higher as you get older. Some experts say that adults should start regular screening at age 48 and stop at age 76. Others say to start before age 48 or continue after age 76. Talk with your doctor about your risk and when to start and stop screening. For women · Breast exam and mammogram. Talk to your doctor about when you should have a clinical breast exam and a mammogram. Medical experts differ on whether and how often women under 50 should have these tests. Your doctor can help you decide what is right for you. · Cervical cancer screening test and pelvic exam. Begin with a Pap test at age 24. The test often is part of a pelvic exam. Starting at age 27, you may choose to have a Pap test, an HPV test, or both tests at the same time (called co-testing). Talk with your doctor about how often to have testing. · Tests for sexually transmitted infections (STIs). Ask whether you should have tests for STIs. You may be at risk if you have sex with more than one person, especially if your partners do not wear condoms. For men · Tests for sexually transmitted infections (STIs). Ask whether you should have tests for STIs. You may be at risk if you have sex with more than one person, especially if you do not wear a condom. · Testicular cancer exam. Ask your doctor whether you should check your testicles regularly. · Prostate exam. Talk to your doctor about whether you should have a blood test (called a PSA test) for prostate cancer. Experts differ on whether and when men should have this test. Some experts suggest it if you are older than 39 and are -American or have a father or brother who got prostate cancer when he was younger than 72. When should you call for help? Watch closely for changes in your health, and be sure to contact your doctor if you have any problems or symptoms that concern you. Where can you learn more? Go to http://www.valente.com/ Enter P072 in the search box to learn more about \"Well Visit, Ages 25 to 48: Care Instructions. \" Current as of: May 27, 2020               Content Version: 12.6 © 2006-2020 [employer]. Care instructions adapted under license by [employer] (which disclaims liability or warranty for this information). If you have questions about a medical condition or this instruction, always ask your healthcare professional. [address] any warranty or liability for your use of this information.

## 2021-01-15 RX ORDER — VALACYCLOVIR HYDROCHLORIDE 1 G/1
1000 TABLET, FILM COATED ORAL DAILY
Qty: 5 TAB | Refills: 0 | Status: SHIPPED | OUTPATIENT
Start: 2021-01-15 | End: 2021-01-20

## 2021-02-24 ENCOUNTER — TELEPHONE (OUTPATIENT)
Dept: INTERNAL MEDICINE CLINIC | Age: 46
End: 2021-02-24

## 2021-02-24 NOTE — TELEPHONE ENCOUNTER
Patient has [employer] PPO and there is no insurance referral required.   May require an order from Dr. Herbert Thomas

## 2021-02-24 NOTE — TELEPHONE ENCOUNTER
Referral    [facility]  [address] [phone]  Fax [phone]    Appt is now = Pt is at her appt as of now    DX  M75.02

## 2021-02-25 ENCOUNTER — TELEPHONE (OUTPATIENT)
Dept: INTERNAL MEDICINE CLINIC | Age: 46
End: 2021-02-25

## 2021-02-25 DIAGNOSIS — M75.02 ADHESIVE CAPSULITIS OF LEFT SHOULDER: Primary | ICD-10-CM

## 2021-09-27 LAB — HBA1C MFR BLD HPLC: 6.8 %

## 2021-12-09 ENCOUNTER — OFFICE VISIT (OUTPATIENT)
Dept: INTERNAL MEDICINE CLINIC | Age: 46
End: 2021-12-09
Payer: COMMERCIAL

## 2021-12-09 VITALS
RESPIRATION RATE: 18 BRPM | WEIGHT: 140.2 LBS | TEMPERATURE: 97.9 F | HEIGHT: 65 IN | SYSTOLIC BLOOD PRESSURE: 112 MMHG | OXYGEN SATURATION: 99 % | HEART RATE: 70 BPM | DIASTOLIC BLOOD PRESSURE: 76 MMHG | BODY MASS INDEX: 23.36 KG/M2

## 2021-12-09 DIAGNOSIS — E10.69 TYPE 1 DIABETES MELLITUS WITH OTHER SPECIFIED COMPLICATION (HCC): ICD-10-CM

## 2021-12-09 DIAGNOSIS — M79.641 PAIN OF RIGHT HAND: Primary | ICD-10-CM

## 2021-12-09 DIAGNOSIS — S66.811A STRAIN OF METACARPOPHALANGEAL JOINT OF RIGHT HAND, INITIAL ENCOUNTER: ICD-10-CM

## 2021-12-09 PROCEDURE — 99213 OFFICE O/P EST LOW 20 MIN: CPT | Performed by: INTERNAL MEDICINE

## 2021-12-09 RX ORDER — DICLOFENAC SODIUM 75 MG/1
TABLET, DELAYED RELEASE ORAL
COMMUNITY
Start: 2021-11-10

## 2021-12-09 NOTE — PROGRESS NOTES
Silva Stoll is a 40 y.o. female    Chief Complaint   Patient presents with    Joint Pain     rt index finger, pinky finger and palm; had x-rays at Ortho but \"said unremarkeable\"        Visit Vitals  /76   Pulse 70   Temp 97.9 °F (36.6 °C) (Oral)   Resp 18   Ht 5' 4.5\" (1.638 m)   Wt 140 lb 3.2 oz (63.6 kg)   SpO2 99%   BMI 23.69 kg/m²       3 most recent PHQ Screens 12/9/2021   Little interest or pleasure in doing things Not at all   Feeling down, depressed, irritable, or hopeless Not at all   Total Score PHQ 2 0       Fall Risk Assessment, last 12 mths 11/5/2020   Able to walk? Yes   Fall in past 12 months? No       Abuse Screening Questionnaire 11/5/2020   Do you ever feel afraid of your partner? N   Are you in a relationship with someone who physically or mentally threatens you? N   Is it safe for you to go home? Y       1. Have you been to the ER, urgent care clinic since your last visit? Hospitalized since your last visit? No     2. Have you seen or consulted any other health care providers outside of the [address] [place] [address] since your last visit? Include any pap smears or colon screening.  No

## 2021-12-09 NOTE — PROGRESS NOTES
Ileana Zapata is a 40 y.o. female who presents today for Joint Pain (rt index finger, pinky finger and palm; had x-rays at Ortho but \"said unremarkeable\" )  . She has a history of   Patient Active Problem List   Diagnosis Code    Diabetes mellitus type 1 ([facility] E10.9    Hair loss L65.9    Moderate nonproliferative diabetic retinopathy ([facility] S23.2288   . Today patient is here for an acute visit. Problem visit:  Ileana Zapata is here for complaint of joint/finger pain. Problem began 3 month(s) ago. Severity is moderate. Index finger MCP joint  Character of problem: Stiff in the AM. Difficult to bend. No swelling. Saw orthopedist in November. X-ray was done. Diagnosed with trigger finger and was put in a brace. Section made worse as her trigger finger seems to be more problems with bending rather than extending it. Does have topical diclofenac at home. She has been taking p.o. diclofenac. She concerns her self with her rheumatologic disease given the fact that she is a type I diabetic and does have family history of rheumatoid arthritis. Denies any ulnar safety. Denies any other small joint involvement. DM: last A1C was <7. Sees endocrine. .     ROS  Review of Systems   Constitutional: Negative for chills, fever and weight loss. HENT: Negative for congestion and sore throat. Eyes: Negative for blurred vision, double vision and photophobia. Respiratory: Negative for cough and shortness of breath. Cardiovascular: Negative for chest pain, palpitations and leg swelling. Gastrointestinal: Negative for abdominal pain, constipation, diarrhea, heartburn, nausea and vomiting. Genitourinary: Negative for dysuria, frequency and urgency. Musculoskeletal: Positive for joint pain. Negative for myalgias. Skin: Negative for rash. Neurological: Negative. Negative for headaches. Endo/Heme/Allergies: Does not bruise/bleed easily.    Psychiatric/Behavioral: Negative for memory loss and suicidal ideas. Visit Vitals  /76   Pulse 70   Temp 97.9 °F (36.6 °C) (Oral)   Resp 18   Ht 5' 4.5\" (1.638 m)   Wt 140 lb 3.2 oz (63.6 kg)   SpO2 99%   BMI 23.69 kg/m²       Physical Exam  Constitutional:       Appearance: She is well-developed. HENT:      Head: Normocephalic and atraumatic. Cardiovascular:      Rate and Rhythm: Normal rate and regular rhythm. Heart sounds: No murmur heard. Pulmonary:      Effort: Pulmonary effort is normal. No respiratory distress. Musculoskeletal:      Comments: Slight discomfort to first MCP joint. No significant swelling. No ulnar shift of hands. Skin:     General: Skin is warm and dry. Neurological:      Mental Status: She is alert and oriented to person, place, and time. Psychiatric:         Behavior: Behavior normal.           Current Outpatient Medications   Medication Sig    diclofenac EC (VOLTAREN) 75 mg EC tablet     Dexcom G6 Sensor angela APPLY 1 EVERY 10 DAYS OR AS DIRECTED SUBCUTANEOUSLY    Dexcom G6 Transmitter angela USE AS DIRECTED    ondansetron (ZOFRAN ODT) 4 mg disintegrating tablet Take 1 Tab by mouth every eight (8) hours as needed for Nausea or Vomiting.  OTHER     levonorgestrel (MIRENA) 20 mcg/24 hr (5 years) IUD 1 Device by IntraUTERine route once.  insulin aspart (NOVOLOG) 100 unit/mL injection Inject 100-120 units daily per insulin pump. No current facility-administered medications for this visit. Past Medical History:   Diagnosis Date    Adhesive capsulitis     Diabetes mellitus type 1 ([facility]     dx 1984      History reviewed. No pertinent surgical history.    Social History     Tobacco Use    Smoking status: Never Smoker    Smokeless tobacco: Never Used   Substance Use Topics    Alcohol use: Yes     Comment: social       Family History   Problem Relation Age of Onset    Thyroid Disease Paternal Grandmother     Lupus Paternal Grandmother     Arthritis-rheumatoid Paternal Grandmother  Cancer Paternal Grandfather         prostate    Cancer Mother         Colorectal Cancer        No Known Allergies     Assessment/Plan  Diagnoses and all orders for this visit:    1. Pain of right hand-more than likely trigger finger, but given the fact that she is a type I diabetic will check inflammatory markers and antibodies. If this persist would send her to see Ortho for injection. Just as needed topical diclofenac in the meantime when she finishes the p.o. diclofenac.  -     SED RATE (ESR); Future  -     C REACTIVE PROTEIN, QT; Future  -     RA + CCP ABS; Future  -     GUADALUPE, DIRECT, W/REFLEX; Future    2. Type 1 diabetes mellitus with other specified complication (HCC) - seeing endo. 3. Strain of metacarpophalangeal joint of right hand, initial encounter  -     SED RATE (ESR); Future  -     C REACTIVE PROTEIN, QT; Future  -     RA + CCP ABS; Future  -     GUADALUPE, DIRECT, W/REFLEX; Future            Lacretia MD Nick  12/9/2021    This note was created with the help of speech recognition software Nehemiah Alves) and may contain some 'sound alike' errors.

## 2021-12-10 LAB
CRP SERPL-MCNC: <0.29 MG/DL (ref 0–0.6)
ERYTHROCYTE [SEDIMENTATION RATE] IN BLOOD: 3 MM/HR (ref 0–20)

## 2021-12-11 LAB — ANA SER QL: NEGATIVE

## 2021-12-14 LAB
CCP IGA+IGG SERPL IA-ACNC: 6 UNITS (ref 0–19)
RHEUMATOID FACT SERPL-ACNC: <10 IU/ML (ref 0–15)

## 2022-03-20 PROBLEM — L65.9 HAIR LOSS: Status: ACTIVE | Noted: 2018-05-24

## 2023-08-09 SDOH — ECONOMIC STABILITY: FOOD INSECURITY: WITHIN THE PAST 12 MONTHS, YOU WORRIED THAT YOUR FOOD WOULD RUN OUT BEFORE YOU GOT MONEY TO BUY MORE.: PATIENT DECLINED

## 2023-08-09 SDOH — ECONOMIC STABILITY: TRANSPORTATION INSECURITY
IN THE PAST 12 MONTHS, HAS LACK OF TRANSPORTATION KEPT YOU FROM MEETINGS, WORK, OR FROM GETTING THINGS NEEDED FOR DAILY LIVING?: PATIENT DECLINED

## 2023-08-09 SDOH — ECONOMIC STABILITY: FOOD INSECURITY: WITHIN THE PAST 12 MONTHS, THE FOOD YOU BOUGHT JUST DIDN'T LAST AND YOU DIDN'T HAVE MONEY TO GET MORE.: PATIENT DECLINED

## 2023-08-09 SDOH — ECONOMIC STABILITY: INCOME INSECURITY: HOW HARD IS IT FOR YOU TO PAY FOR THE VERY BASICS LIKE FOOD, HOUSING, MEDICAL CARE, AND HEATING?: PATIENT DECLINED

## 2023-08-09 SDOH — ECONOMIC STABILITY: HOUSING INSECURITY
IN THE LAST 12 MONTHS, WAS THERE A TIME WHEN YOU DID NOT HAVE A STEADY PLACE TO SLEEP OR SLEPT IN A SHELTER (INCLUDING NOW)?: PATIENT REFUSED

## 2023-08-10 ENCOUNTER — TELEMEDICINE (OUTPATIENT)
Age: 48
End: 2023-08-10

## 2023-08-10 DIAGNOSIS — R10.84 GENERALIZED ABDOMINAL PAIN: ICD-10-CM

## 2023-08-10 DIAGNOSIS — R19.4 CHANGE IN BOWEL HABIT: Primary | ICD-10-CM

## 2023-08-10 DIAGNOSIS — R19.4 CHANGE IN BOWEL HABIT: ICD-10-CM

## 2023-08-10 DIAGNOSIS — E10.69 TYPE 1 DIABETES MELLITUS WITH OTHER SPECIFIED COMPLICATION (HCC): ICD-10-CM

## 2023-08-10 LAB
APPEARANCE UR: CLEAR
BILIRUB UR QL: NEGATIVE
COLOR UR: NORMAL
COMMENT:: NORMAL
GLUCOSE UR STRIP.AUTO-MCNC: NEGATIVE MG/DL
HGB UR QL STRIP: NEGATIVE
KETONES UR QL STRIP.AUTO: NEGATIVE MG/DL
LEUKOCYTE ESTERASE UR QL STRIP.AUTO: NEGATIVE
NITRITE UR QL STRIP.AUTO: NEGATIVE
PH UR STRIP: 5.5 (ref 5–8)
PROT UR STRIP-MCNC: NEGATIVE MG/DL
SP GR UR REFRACTOMETRY: 1.02 (ref 1–1.03)
SPECIMEN HOLD: NORMAL
UROBILINOGEN UR QL STRIP.AUTO: 0.2 EU/DL (ref 0.2–1)

## 2023-08-10 PROCEDURE — 99214 OFFICE O/P EST MOD 30 MIN: CPT | Performed by: INTERNAL MEDICINE

## 2023-08-10 ASSESSMENT — PATIENT HEALTH QUESTIONNAIRE - PHQ9
1. LITTLE INTEREST OR PLEASURE IN DOING THINGS: 0
SUM OF ALL RESPONSES TO PHQ QUESTIONS 1-9: 0
SUM OF ALL RESPONSES TO PHQ9 QUESTIONS 1 & 2: 0
SUM OF ALL RESPONSES TO PHQ QUESTIONS 1-9: 0
SUM OF ALL RESPONSES TO PHQ QUESTIONS 1-9: 0
2. FEELING DOWN, DEPRESSED OR HOPELESS: 0
SUM OF ALL RESPONSES TO PHQ QUESTIONS 1-9: 0

## 2023-08-10 ASSESSMENT — ENCOUNTER SYMPTOMS
CONSTIPATION: 1
DIARRHEA: 1
BACK PAIN: 0
ABDOMINAL PAIN: 0
SHORTNESS OF BREATH: 0
CHEST TIGHTNESS: 0

## 2023-08-10 NOTE — PROGRESS NOTES
Joe Justin was seen on 8/10/2023 using synchronous (real-time) audio-video technology; [employer]/Epic. Consent: Joe Justin, who was seen by synchronous (real-time) audio-video technology, and/or her healthcare decision maker, is aware that this patient-initiated, Telehealth encounter on 8/10/2023 is a billable service, with coverage as determined by her insurance carrier. She is aware that she may receive a bill and has provided verbal consent to proceed: In the last 12 months: Yes. I was in the office while conducting this encounter. Joe Justin is a 53 y.o. female who presents today for GI Problem  . She has a history of   Patient Active Problem List   Diagnosis    Diabetes mellitus type 1 (HCC)    Moderate nonproliferative diabetic retinopathy (HCC)    Hair loss   . Today patient is being seen for GI concerns. she does not have other concerns. GI Concerns: reports changes bowel habits. Started over the last months. Looser than normal. Thinner than normal. No changes to eating habits. No changes in GI symptoms. No upper GI issues. Does report some heaviness or fullness but no actual abdominal pain. Constipated over the last weekend. Weight has been stable per endocrine notes and in June she was listed as 137 pounds. This was within a couple pounds of our last visit. Labs from endocrine done in June include a BMP urine for protein and an A1c which were all normal.  Colonoscopy was done in early 2021 and she was found to have 1 polyp and otherwise was normal.  Mother did have colorectal cancer and that is the reason that she had a early colorectal cancer screening. Diabetes, this is managed through endocrine. Seeing Dr. Monroy Memos. Last A1c I see on file was in June at 6.5. Denies any significant changes to her blood sugars recently. ROS  Review of Systems   Constitutional:  Negative for fatigue and fever. HENT:  Negative for congestion and ear pain.

## 2023-08-11 LAB
ALBUMIN SERPL-MCNC: 3.9 G/DL (ref 3.5–5)
ALBUMIN/GLOB SERPL: 1.4 (ref 1.1–2.2)
ALP SERPL-CCNC: 48 U/L (ref 45–117)
ALT SERPL-CCNC: 18 U/L (ref 12–78)
ANION GAP SERPL CALC-SCNC: 4 MMOL/L (ref 5–15)
AST SERPL-CCNC: 10 U/L (ref 15–37)
BASOPHILS # BLD: 0.1 K/UL (ref 0–0.1)
BASOPHILS NFR BLD: 1 % (ref 0–1)
BILIRUB SERPL-MCNC: 0.4 MG/DL (ref 0.2–1)
BUN SERPL-MCNC: 11 MG/DL (ref 6–20)
BUN/CREAT SERPL: 14 (ref 12–20)
CALCIUM SERPL-MCNC: 9 MG/DL (ref 8.5–10.1)
CHLORIDE SERPL-SCNC: 103 MMOL/L (ref 97–108)
CO2 SERPL-SCNC: 30 MMOL/L (ref 21–32)
CREAT SERPL-MCNC: 0.78 MG/DL (ref 0.55–1.02)
DIFFERENTIAL METHOD BLD: NORMAL
EOSINOPHIL # BLD: 0.2 K/UL (ref 0–0.4)
EOSINOPHIL NFR BLD: 3 % (ref 0–7)
ERYTHROCYTE [DISTWIDTH] IN BLOOD BY AUTOMATED COUNT: 13.2 % (ref 11.5–14.5)
GLOBULIN SER CALC-MCNC: 2.7 G/DL (ref 2–4)
GLUCOSE SERPL-MCNC: 108 MG/DL (ref 65–100)
HCT VFR BLD AUTO: 40.5 % (ref 35–47)
HGB BLD-MCNC: 12.8 G/DL (ref 11.5–16)
IMM GRANULOCYTES # BLD AUTO: 0 K/UL (ref 0–0.04)
IMM GRANULOCYTES NFR BLD AUTO: 0 % (ref 0–0.5)
LIPASE SERPL-CCNC: 124 U/L (ref 73–393)
LYMPHOCYTES # BLD: 3 K/UL (ref 0.8–3.5)
LYMPHOCYTES NFR BLD: 38 % (ref 12–49)
MCH RBC QN AUTO: 29.4 PG (ref 26–34)
MCHC RBC AUTO-ENTMCNC: 31.6 G/DL (ref 30–36.5)
MCV RBC AUTO: 92.9 FL (ref 80–99)
MONOCYTES # BLD: 0.6 K/UL (ref 0–1)
MONOCYTES NFR BLD: 8 % (ref 5–13)
NEUTS SEG # BLD: 3.9 K/UL (ref 1.8–8)
NEUTS SEG NFR BLD: 50 % (ref 32–75)
NRBC # BLD: 0 K/UL (ref 0–0.01)
NRBC BLD-RTO: 0 PER 100 WBC
PLATELET # BLD AUTO: 247 K/UL (ref 150–400)
PMV BLD AUTO: 10.9 FL (ref 8.9–12.9)
POTASSIUM SERPL-SCNC: 3.9 MMOL/L (ref 3.5–5.1)
PROT SERPL-MCNC: 6.6 G/DL (ref 6.4–8.2)
RBC # BLD AUTO: 4.36 M/UL (ref 3.8–5.2)
SODIUM SERPL-SCNC: 137 MMOL/L (ref 136–145)
WBC # BLD AUTO: 7.7 K/UL (ref 3.6–11)

## 2024-02-09 LAB
ESTIMATED AVERAGE GLUCOSE: NORMAL
HBA1C MFR BLD: 6.5 %

## 2024-03-15 NOTE — TELEPHONE ENCOUNTER
----- Message from [address] [facility] [address]. Sol Diaz sent at 2/24/2021  1:31 PM EST -----  Regarding: Referral Request  Contact: [phone]  Hi! I've been seeing a physical therapist at [facility] and need a referral. I've been dealing with Frozen shoulder in left shoulder since July. Spontaneous, unlabored and symmetrical

## 2025-04-25 LAB
ESTIMATED AVERAGE GLUCOSE: ABNORMAL
HBA1C MFR BLD: 6.6 %

## 2025-06-20 ENCOUNTER — OFFICE VISIT (OUTPATIENT)
Facility: CLINIC | Age: 50
End: 2025-06-20
Payer: COMMERCIAL

## 2025-06-20 VITALS
OXYGEN SATURATION: 98 % | BODY MASS INDEX: 22.81 KG/M2 | DIASTOLIC BLOOD PRESSURE: 74 MMHG | WEIGHT: 135 LBS | RESPIRATION RATE: 16 BRPM | TEMPERATURE: 97.5 F | HEART RATE: 80 BPM | SYSTOLIC BLOOD PRESSURE: 117 MMHG

## 2025-06-20 DIAGNOSIS — R21 SKIN RASH: ICD-10-CM

## 2025-06-20 DIAGNOSIS — L29.9 GENERALIZED PRURITUS: Primary | ICD-10-CM

## 2025-06-20 DIAGNOSIS — E10.69 TYPE 1 DIABETES MELLITUS WITH OTHER SPECIFIED COMPLICATION (HCC): ICD-10-CM

## 2025-06-20 DIAGNOSIS — L29.9 GENERALIZED PRURITUS: ICD-10-CM

## 2025-06-20 PROCEDURE — 99214 OFFICE O/P EST MOD 30 MIN: CPT | Performed by: INTERNAL MEDICINE

## 2025-06-20 RX ORDER — NYSTATIN 100000 [USP'U]/G
POWDER TOPICAL
Qty: 60 G | Refills: 3 | Status: SHIPPED | OUTPATIENT
Start: 2025-06-20

## 2025-06-20 ASSESSMENT — ENCOUNTER SYMPTOMS
ROS SKIN COMMENTS: ITCHING.
DIARRHEA: 0
CONSTIPATION: 0
ABDOMINAL PAIN: 0
BACK PAIN: 0
SHORTNESS OF BREATH: 0
CHEST TIGHTNESS: 0

## 2025-06-20 NOTE — PROGRESS NOTES
Elaina Kay is a 50 y.o. female who presents today for Skin Problem  .      She has a history of   Patient Active Problem List   Diagnosis    Diabetes mellitus type 1 (HCC)    Moderate nonproliferative diabetic retinopathy (HCC)    Hair loss   .    Today patient is here for an acute visit.   she does have other concerns.    History of Present Illness  The patient is a 50-year-old female who presents for an acute visit for skin concerns. She has not been seen in close to 2 years.    She reports experiencing pruritus, which she initially attributed to a knot. She expresses concern about the possibility of molluscum contagiosum, a condition her daughter had several years ago. She has previously consulted a dermatologist for similar issues. The lesions are described as raised, inflamed, and intensely pruritic, with no history of appearing flat. She reports no other skin abnormalities or dryness beyond her usual condition. She does not have any history of wheezing, asthma, or reactive airway disease. She has not changed her laundry detergent. She has sun spots but has not had any issues with sun exposure. She has not introduced any new medications recently. She has not experienced any significant allergies this year, such as rhinorrhea, postnasal drip, ocular pruritus, or nasal congestion. She first noticed the pruritus on her abdomen, without any associated skin lesions.    Her last blood work was conducted in 03/2025 by her endocrinologist, revealing an A1c level of 6.5. She experienced a hypoglycemic episode on the same day her son was hospitalized for surgery, which she believes was stress-induced. Her blood glucose level spiked to 336 during a recent hospital visit, prompting her to change her insulin site while in the emergency room. She reports no proteinuria.    She has a history of fungal infections at her bra line and recently purchased an over-the-counter athlete's foot ointment for

## 2025-06-20 NOTE — PROGRESS NOTES
Chief Complaint   Patient presents with    Skin Problem       1. Have you been to the ER, urgent care clinic since your last visit?  Hospitalized since your last visit?No    2. Have you seen or consulted any other health care providers outside of the [facility] since your last visit?  Include any pap smears or colon screening. No    Per patient she feels that her skin is very itchy in random parts of her body, and specified there are two / three spots on her chest that she is mostly concerned about. She states they grow and shrink, and feels they may be spreading to the side of her right breast.

## 2025-06-21 ENCOUNTER — RESULTS FOLLOW-UP (OUTPATIENT)
Facility: CLINIC | Age: 50
End: 2025-06-21

## 2025-06-21 LAB
ALBUMIN SERPL-MCNC: 4.2 G/DL (ref 3.5–5)
ALBUMIN/GLOB SERPL: 1.3 (ref 1.1–2.2)
ALP SERPL-CCNC: 65 U/L (ref 45–117)
ALT SERPL-CCNC: 20 U/L (ref 12–78)
ANION GAP SERPL CALC-SCNC: 5 MMOL/L (ref 2–12)
AST SERPL-CCNC: 12 U/L (ref 15–37)
BASOPHILS # BLD: 0.07 K/UL (ref 0–0.1)
BASOPHILS NFR BLD: 1.1 % (ref 0–1)
BILIRUB SERPL-MCNC: 0.5 MG/DL (ref 0.2–1)
BUN SERPL-MCNC: 11 MG/DL (ref 6–20)
BUN/CREAT SERPL: 13 (ref 12–20)
CALCIUM SERPL-MCNC: 9.7 MG/DL (ref 8.5–10.1)
CHLORIDE SERPL-SCNC: 107 MMOL/L (ref 97–108)
CO2 SERPL-SCNC: 26 MMOL/L (ref 21–32)
CREAT SERPL-MCNC: 0.86 MG/DL (ref 0.55–1.02)
DIFFERENTIAL METHOD BLD: ABNORMAL
EOSINOPHIL # BLD: 0.47 K/UL (ref 0–0.4)
EOSINOPHIL NFR BLD: 7.2 % (ref 0–7)
ERYTHROCYTE [DISTWIDTH] IN BLOOD BY AUTOMATED COUNT: 13.2 % (ref 11.5–14.5)
GLOBULIN SER CALC-MCNC: 3.3 G/DL (ref 2–4)
GLUCOSE SERPL-MCNC: 111 MG/DL (ref 65–100)
HCT VFR BLD AUTO: 40.3 % (ref 35–47)
HGB BLD-MCNC: 13.2 G/DL (ref 11.5–16)
IMM GRANULOCYTES # BLD AUTO: 0.01 K/UL (ref 0–0.04)
IMM GRANULOCYTES NFR BLD AUTO: 0.2 % (ref 0–0.5)
LYMPHOCYTES # BLD: 2.38 K/UL (ref 0.8–3.5)
LYMPHOCYTES NFR BLD: 36.6 % (ref 12–49)
MCH RBC QN AUTO: 29.7 PG (ref 26–34)
MCHC RBC AUTO-ENTMCNC: 32.8 G/DL (ref 30–36.5)
MCV RBC AUTO: 90.6 FL (ref 80–99)
MONOCYTES # BLD: 0.97 K/UL (ref 0–1)
MONOCYTES NFR BLD: 14.9 % (ref 5–13)
NEUTS SEG # BLD: 2.6 K/UL (ref 1.8–8)
NEUTS SEG NFR BLD: 40 % (ref 32–75)
NRBC # BLD: 0 K/UL (ref 0–0.01)
NRBC BLD-RTO: 0 PER 100 WBC
PLATELET # BLD AUTO: 243 K/UL (ref 150–400)
PMV BLD AUTO: 11.2 FL (ref 8.9–12.9)
POTASSIUM SERPL-SCNC: 4.2 MMOL/L (ref 3.5–5.1)
PROT SERPL-MCNC: 7.5 G/DL (ref 6.4–8.2)
RBC # BLD AUTO: 4.45 M/UL (ref 3.8–5.2)
SODIUM SERPL-SCNC: 138 MMOL/L (ref 136–145)
TSH SERPL DL<=0.05 MIU/L-ACNC: 1.22 UIU/ML (ref 0.36–3.74)
WBC # BLD AUTO: 6.5 K/UL (ref 3.6–11)